# Patient Record
(demographics unavailable — no encounter records)

---

## 2025-02-26 NOTE — HISTORY OF PRESENT ILLNESS
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 Still feels like he needs to strain with his regular speaking voice. No longer receiving voice therapy, states it is too difficult to commute. Takes Nexium PRN. No longer on the Famotidine. Eating and drinking well but has to take his time with chewing/ swallowing. Denies throat discomfort, SOB and odynophagia.

## 2025-02-26 NOTE — ADDENDUM
[FreeTextEntry1] :  Documented by Shira Allen acting as scribe for Dr. Tapia on 02/26/2025. All Medical record entries made by the Scribe were at my, Dr. Tapia, direction and personally dictated by me on 02/26/2025 . I have reviewed the chart and agree that the record accurately reflects my personal performance of the history, physical exam, assessment and plan. I have also personally directed, reviewed, and agreed with the discharge instructions.

## 2025-02-26 NOTE — CONSULT LETTER
[Dear  ___] : Dear  [unfilled], [Consult Letter:] : I had the pleasure of evaluating your patient, [unfilled]. [Please see my note below.] : Please see my note below. [Consult Closing:] : Thank you very much for allowing me to participate in the care of this patient.  If you have any questions, please do not hesitate to contact me. [Sincerely,] : Sincerely, [FreeTextEntry3] : Burt Tapia MD, PhD\par  Chief, Division of Laryngology\par  Department of Otolaryngology\par  [facility]\par  Pediatric Otolaryngology, [facility]\par   of Otolaryngology\par  [facility] [employer] [facility] at [facility]\par

## 2025-02-26 NOTE — CONSULT LETTER
[Dear  ___] : Dear  [unfilled], [Consult Letter:] : I had the pleasure of evaluating your patient, [unfilled]. [Please see my note below.] : Please see my note below. [Consult Closing:] : Thank you very much for allowing me to participate in the care of this patient.  If you have any questions, please do not hesitate to contact me. [Sincerely,] : Sincerely, [FreeTextEntry3] : Burt Tapia MD, PhD\par  Chief, Division of Laryngology\par  Department of Otolaryngology\par  [facility]\par  Pediatric Otolaryngology, [facility]\par   of Otolaryngology\par  [facility] [employer] [facility] at [facility]\par

## 2025-02-26 NOTE — HISTORY OF PRESENT ILLNESS
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 Still feels like he needs to strain with his regular speaking voice. No longer receiving voice therapy, states it is too difficult to commute. Takes Nexium PRN. No longer on the Famotidine. Eating and drinking well but has to take his time with chewing/ swallowing. Denies throat discomfort, SOB and odynophagia.